# Patient Record
Sex: FEMALE | Race: BLACK OR AFRICAN AMERICAN | NOT HISPANIC OR LATINO | URBAN - METROPOLITAN AREA
[De-identification: names, ages, dates, MRNs, and addresses within clinical notes are randomized per-mention and may not be internally consistent; named-entity substitution may affect disease eponyms.]

---

## 2022-07-08 ENCOUNTER — EMERGENCY (EMERGENCY)
Facility: HOSPITAL | Age: 42
LOS: 0 days | Discharge: HOME | End: 2022-07-09
Attending: STUDENT IN AN ORGANIZED HEALTH CARE EDUCATION/TRAINING PROGRAM

## 2022-07-08 VITALS
OXYGEN SATURATION: 96 % | RESPIRATION RATE: 18 BRPM | DIASTOLIC BLOOD PRESSURE: 90 MMHG | WEIGHT: 203.05 LBS | HEART RATE: 67 BPM | TEMPERATURE: 98 F | SYSTOLIC BLOOD PRESSURE: 135 MMHG | HEIGHT: 67 IN

## 2022-07-08 DIAGNOSIS — Q61.3 POLYCYSTIC KIDNEY, UNSPECIFIED: ICD-10-CM

## 2022-07-08 DIAGNOSIS — M79.644 PAIN IN RIGHT FINGER(S): ICD-10-CM
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
## 2022-07-08 PROCEDURE — 99283 EMERGENCY DEPT VISIT LOW MDM: CPT

## 2022-07-08 PROCEDURE — 73140 X-RAY EXAM OF FINGER(S): CPT | Mod: 26,RT

## 2022-07-08 NOTE — ED ADULT TRIAGE NOTE - CHIEF COMPLAINT QUOTE
pt presents to the ED c/o injury to right pinky s/p picking up a bag. pt endorses b/l hand pain x 4-5 months. denies: numbness/tingling, +ROM, +pulses   hx cyst in kidneys, anemia

## 2022-07-08 NOTE — ED PROVIDER NOTE - CLINICAL SUMMARY MEDICAL DECISION MAKING FREE TEXT BOX
41 yo woman hx polycystic kidney disease, kidney stones, anemia presenting for right finger pain and inability to extend DIP. Jammed finger when reached down to  a bag and felt pain and inability to extend at DIP. Denies tingling, paresthesias, denies pressure, pain out of proportion. Previously in her normal state of health. Did not take anything for the pain. Has never happened before. Exam notable for inability to extend right 5th digit, likely avulsion of extensor tendon of right 5th digit. Will obtain finger XR r/o fracture, patient declines pain medication. 43 yo woman hx polycystic kidney disease, kidney stones, anemia presenting for right finger pain and inability to extend DIP. Jammed finger when reached down to  a bag and felt pain and inability to extend at DIP. Denies tingling, paresthesias, denies pressure, pain out of proportion. Previously in her normal state of health. Did not take anything for the pain. Has never happened before. Exam notable for inability to extend right 5th digit, likely avulsion of extensor tendon of right 5th digit. Will obtain finger XR r/o fracture, patient declines pain medication.    No fracture on imaging. R 5th DIP splinted. Plan for home with orthopedic surgery followup. Likely mallet finger R 5th digit

## 2022-07-08 NOTE — ED PROVIDER NOTE - OBJECTIVE STATEMENT
41 yo woman hx 43 yo woman hx polycystic kidney disease, kidney stones, anemia presenting for right finger pain and inability to extend DIP. Jammed finger when reached down to  a bag and felt pain and inability to extend at DIP. Denies tingling, paresthesias, denies pressure, pain out of proportion. Previously in her normal state of health. Did not take anything for the pain. Has never happened before. Also bilateral morning stiffness and pain at MCP, advised by nephrologist to followup with rheumatology, needs referral. Take calcium citrate and iron, hormone ius

## 2022-07-08 NOTE — ED PROVIDER NOTE - NSFOLLOWUPCLINICS_GEN_ALL_ED_FT
[facility]  Orthopedic Surgery  [address] [address]  Phone: [phone]  Fax:   Follow Up Time: 4-6 Days    [facility]  Orthopedic Surgery  [address] [place] [address]  Phone: [phone]  Fax:   Follow Up Time: 4-6 Days

## 2022-07-08 NOTE — ED PROVIDER NOTE - PATIENT PORTAL LINK FT
You can access the FollowMyHealth Patient Portal offered by [facility] by registering at the following website: [email or web address]. By joining [facility]’s FollowMyHealth portal, you will also be able to view your health information using other applications (apps) compatible with our system.

## 2022-07-08 NOTE — ED ADULT NURSE NOTE - OBJECTIVE STATEMENT
Pt presents to the ED complaining of R finger pain/ l hand pain. Pt states she was picking something up from the floor when she bent her pinky finger. denies any fall/trauma. States that L pinky pain happened today and R hand pain has been going on for a few months. Denies allergies to medication. Hx fibrocystic kidney dx, precancerous cells in colon. Denies fever, chills, tremors. pinky finger stationed in bended form. Pt able to move L hand freely. Family hx gout, arthritis

## 2022-11-22 ENCOUNTER — RESULT REVIEW (OUTPATIENT)
Age: 42
End: 2022-11-22
